# Patient Record
Sex: FEMALE | Race: WHITE | HISPANIC OR LATINO | URBAN - METROPOLITAN AREA
[De-identification: names, ages, dates, MRNs, and addresses within clinical notes are randomized per-mention and may not be internally consistent; named-entity substitution may affect disease eponyms.]

---

## 2017-07-07 ENCOUNTER — IMPORTED ENCOUNTER (OUTPATIENT)
Dept: URBAN - METROPOLITAN AREA CLINIC 50 | Facility: CLINIC | Age: 67
End: 2017-07-07

## 2017-07-14 ENCOUNTER — IMPORTED ENCOUNTER (OUTPATIENT)
Dept: URBAN - METROPOLITAN AREA CLINIC 50 | Facility: CLINIC | Age: 67
End: 2017-07-14

## 2017-08-16 ENCOUNTER — IMPORTED ENCOUNTER (OUTPATIENT)
Dept: URBAN - METROPOLITAN AREA CLINIC 50 | Facility: CLINIC | Age: 67
End: 2017-08-16

## 2017-09-22 ENCOUNTER — IMPORTED ENCOUNTER (OUTPATIENT)
Dept: URBAN - METROPOLITAN AREA CLINIC 50 | Facility: CLINIC | Age: 67
End: 2017-09-22

## 2017-09-27 ENCOUNTER — IMPORTED ENCOUNTER (OUTPATIENT)
Dept: URBAN - METROPOLITAN AREA CLINIC 50 | Facility: CLINIC | Age: 67
End: 2017-09-27

## 2018-06-01 ENCOUNTER — IMPORTED ENCOUNTER (OUTPATIENT)
Dept: URBAN - METROPOLITAN AREA CLINIC 50 | Facility: CLINIC | Age: 68
End: 2018-06-01

## 2018-08-24 ENCOUNTER — IMPORTED ENCOUNTER (OUTPATIENT)
Dept: URBAN - METROPOLITAN AREA CLINIC 50 | Facility: CLINIC | Age: 68
End: 2018-08-24

## 2018-08-31 ENCOUNTER — IMPORTED ENCOUNTER (OUTPATIENT)
Dept: URBAN - METROPOLITAN AREA CLINIC 50 | Facility: CLINIC | Age: 68
End: 2018-08-31

## 2018-10-26 ENCOUNTER — IMPORTED ENCOUNTER (OUTPATIENT)
Dept: URBAN - METROPOLITAN AREA CLINIC 50 | Facility: CLINIC | Age: 68
End: 2018-10-26

## 2018-12-07 ENCOUNTER — IMPORTED ENCOUNTER (OUTPATIENT)
Dept: URBAN - METROPOLITAN AREA CLINIC 50 | Facility: CLINIC | Age: 68
End: 2018-12-07

## 2019-01-04 ENCOUNTER — IMPORTED ENCOUNTER (OUTPATIENT)
Dept: URBAN - METROPOLITAN AREA CLINIC 50 | Facility: CLINIC | Age: 69
End: 2019-01-04

## 2019-01-09 ENCOUNTER — IMPORTED ENCOUNTER (OUTPATIENT)
Dept: URBAN - METROPOLITAN AREA CLINIC 50 | Facility: CLINIC | Age: 69
End: 2019-01-09

## 2019-01-14 ENCOUNTER — IMPORTED ENCOUNTER (OUTPATIENT)
Dept: URBAN - METROPOLITAN AREA CLINIC 50 | Facility: CLINIC | Age: 69
End: 2019-01-14

## 2019-06-07 ENCOUNTER — IMPORTED ENCOUNTER (OUTPATIENT)
Dept: URBAN - METROPOLITAN AREA CLINIC 50 | Facility: CLINIC | Age: 69
End: 2019-06-07

## 2019-07-05 ENCOUNTER — IMPORTED ENCOUNTER (OUTPATIENT)
Dept: URBAN - METROPOLITAN AREA CLINIC 50 | Facility: CLINIC | Age: 69
End: 2019-07-05

## 2019-07-05 NOTE — PATIENT DISCUSSION
"""Continue Vyzulta both eyes at bedtime . ( has 5 bottles of Xalatan that she can use up in place ""

## 2020-01-10 ENCOUNTER — IMPORTED ENCOUNTER (OUTPATIENT)
Dept: URBAN - METROPOLITAN AREA CLINIC 50 | Facility: CLINIC | Age: 70
End: 2020-01-10

## 2020-01-13 ENCOUNTER — IMPORTED ENCOUNTER (OUTPATIENT)
Dept: URBAN - METROPOLITAN AREA CLINIC 50 | Facility: CLINIC | Age: 70
End: 2020-01-13

## 2020-02-03 ENCOUNTER — IMPORTED ENCOUNTER (OUTPATIENT)
Dept: URBAN - METROPOLITAN AREA CLINIC 50 | Facility: CLINIC | Age: 70
End: 2020-02-03

## 2020-05-18 ENCOUNTER — IMPORTED ENCOUNTER (OUTPATIENT)
Dept: URBAN - METROPOLITAN AREA CLINIC 50 | Facility: CLINIC | Age: 70
End: 2020-05-18

## 2020-07-06 ENCOUNTER — IMPORTED ENCOUNTER (OUTPATIENT)
Dept: URBAN - METROPOLITAN AREA CLINIC 50 | Facility: CLINIC | Age: 70
End: 2020-07-06

## 2020-07-06 NOTE — PATIENT DISCUSSION
"""Elevated IOP OU today.  Long discussion with patient about the severity of her Glaucoma and the ""

## 2020-07-08 ENCOUNTER — IMPORTED ENCOUNTER (OUTPATIENT)
Dept: URBAN - METROPOLITAN AREA CLINIC 50 | Facility: CLINIC | Age: 70
End: 2020-07-08

## 2020-07-09 ENCOUNTER — IMPORTED ENCOUNTER (OUTPATIENT)
Dept: URBAN - METROPOLITAN AREA CLINIC 50 | Facility: CLINIC | Age: 70
End: 2020-07-09

## 2020-07-27 ENCOUNTER — IMPORTED ENCOUNTER (OUTPATIENT)
Dept: URBAN - METROPOLITAN AREA CLINIC 50 | Facility: CLINIC | Age: 70
End: 2020-07-27

## 2020-08-17 ENCOUNTER — IMPORTED ENCOUNTER (OUTPATIENT)
Dept: URBAN - METROPOLITAN AREA CLINIC 50 | Facility: CLINIC | Age: 70
End: 2020-08-17

## 2021-02-01 ENCOUNTER — IMPORTED ENCOUNTER (OUTPATIENT)
Dept: URBAN - METROPOLITAN AREA CLINIC 50 | Facility: CLINIC | Age: 71
End: 2021-02-01

## 2021-03-23 ENCOUNTER — IMPORTED ENCOUNTER (OUTPATIENT)
Dept: URBAN - METROPOLITAN AREA CLINIC 50 | Facility: CLINIC | Age: 71
End: 2021-03-23

## 2021-04-09 ENCOUNTER — IMPORTED ENCOUNTER (OUTPATIENT)
Dept: URBAN - METROPOLITAN AREA CLINIC 50 | Facility: CLINIC | Age: 71
End: 2021-04-09

## 2021-04-12 ENCOUNTER — IMPORTED ENCOUNTER (OUTPATIENT)
Dept: URBAN - METROPOLITAN AREA CLINIC 50 | Facility: CLINIC | Age: 71
End: 2021-04-12

## 2021-04-12 NOTE — PATIENT DISCUSSION
"""IOP stable today. Will continue current drop therapy. "" ""Continue Xalatan both eyes at bedtime . "" ""Continue Betimol both eyes in the morning ."""

## 2021-04-12 NOTE — PATIENT DISCUSSION
"""Sever dry eye. Will start Restasis OU BID.  Patient already scheduled for IOP check and cataract ""

## 2021-04-18 ASSESSMENT — PACHYMETRY
OD_CT_UM: 524
OS_CT_UM: 531
OS_CT_UM: 531
OD_CT_UM: 524
OS_CT_UM: 531
OD_CT_UM: 524
OS_CT_UM: 531
OD_CT_UM: 524
OS_CT_UM: 531
OD_CT_UM: 524
OS_CT_UM: 531
OD_CT_UM: 524
OS_CT_UM: 531
OS_CT_UM: 531
OD_CT_UM: 524
OS_CT_UM: 531
OD_CT_UM: 524
OS_CT_UM: 531
OS_CT_UM: 531
OD_CT_UM: 524
OS_CT_UM: 531
OS_CT_UM: 531
OD_CT_UM: 524
OD_CT_UM: 524
OS_CT_UM: 531
OS_CT_UM: 531
OD_CT_UM: 524
OS_CT_UM: 531
OD_CT_UM: 524
OS_CT_UM: 531
OD_CT_UM: 524
OD_CT_UM: 524
OS_CT_UM: 531
OS_CT_UM: 531
OD_CT_UM: 524
OD_CT_UM: 524

## 2021-04-18 ASSESSMENT — TONOMETRY
OD_IOP_MMHG: 12
OS_IOP_MMHG: 13
OD_IOP_MMHG: 13
OD_IOP_MMHG: 19
OD_IOP_MMHG: 16
OD_IOP_MMHG: 15
OS_IOP_MMHG: 21
OD_IOP_MMHG: 15
OD_IOP_MMHG: 11
OD_IOP_MMHG: 14
OD_IOP_MMHG: 15
OS_IOP_MMHG: 15
OD_IOP_MMHG: 14
OD_IOP_MMHG: 15
OD_IOP_MMHG: 18
OS_IOP_MMHG: 16
OD_IOP_MMHG: 14
OS_IOP_MMHG: 16
OS_IOP_MMHG: 24
OD_IOP_MMHG: 16
OS_IOP_MMHG: 14
OS_IOP_MMHG: 12
OD_IOP_MMHG: 16
OS_IOP_MMHG: 16
OS_IOP_MMHG: 25
OS_IOP_MMHG: 15
OS_IOP_MMHG: 14
OD_IOP_MMHG: 14
OD_IOP_MMHG: 13
OS_IOP_MMHG: 15
OS_IOP_MMHG: 16
OS_IOP_MMHG: 15
OS_IOP_MMHG: 16
OD_IOP_MMHG: 15
OD_IOP_MMHG: 20
OD_IOP_MMHG: 15
OD_IOP_MMHG: 22
OS_IOP_MMHG: 15
OS_IOP_MMHG: 17
OS_IOP_MMHG: 22
OD_IOP_MMHG: 14
OS_IOP_MMHG: 14
OS_IOP_MMHG: 15
OD_IOP_MMHG: 14
OD_IOP_MMHG: 13
OD_IOP_MMHG: 15
OS_IOP_MMHG: 20
OD_IOP_MMHG: 15
OD_IOP_MMHG: 14
OD_IOP_MMHG: 17
OS_IOP_MMHG: 19
OS_IOP_MMHG: 18
OS_IOP_MMHG: 15
OS_IOP_MMHG: 14
OS_IOP_MMHG: 11
OD_IOP_MMHG: 12
OS_IOP_MMHG: 15
OS_IOP_MMHG: 14
OS_IOP_MMHG: 21
OD_IOP_MMHG: 21

## 2021-04-18 ASSESSMENT — VISUAL ACUITY
OS_CC: J1
OS_CC: 20/30
OD_CC: 20/25
OS_CC: 20/25
OS_CC: J1@ 14 IN
OD_CC: 20/40
OS_CC: J2@ 16 IN
OS_OTHER: 20/30. 20/60.
OS_OTHER: 20/60. >20/400.
OD_CC: 20/20-2
OD_BAT: 20/50
OS_CC: 20/25-1
OS_CC: 20/30
OS_OTHER: 20/50-2. >20/400.
OD_CC: 20/30
OS_BAT: 20/40
OS_CC: 20/30
OS_CC: J1
OD_OTHER: 20/30. 20/60.
OD_BAT: 20/50
OD_SC: 20/25
OD_CC: 20/30
OD_CC: 20/40-1
OS_CC: 20/25-2
OS_BAT: 20/60
OS_BAT: 20/50-2
OD_CC: J1
OS_OTHER: 20/40. 20/50.
OD_CC: 20/25-
OS_SC: 20/20
OD_CC: J2@ 16 IN
OD_OTHER: 20/30. 20/40.
OS_BAT: 20/30
OS_CC: 20/25
OS_CC: 20/25-
OD_CC: 20/30
OS_CC: 20/30+2
OS_CC: 20/30-1
OD_CC: 20/40
OD_CC: 20/30-2
OD_CC: J1
OS_CC: J1+@ 18 IN
OS_BAT: 20/30
OS_OTHER: 20/30. 20/50.
OS_CC: 20/25-1
OD_OTHER: 20/40. >20/400.
OS_CC: 20/30
OD_CC: J1@ 14 IN
OD_OTHER: 20/50. <20/400.
OD_CC: 20/30-1
OD_CC: 20/30-1
OD_OTHER: 20/50. 20/60.
OD_BAT: 20/40
OS_CC: 20/40-2
OS_CC: 20/30
OD_CC: 20/30-2
OD_BAT: 20/30
OD_CC: J1+@ 18 IN
OD_CC: 20/25+2
OD_BAT: 20/30

## 2021-06-16 ENCOUNTER — DIAGNOSTICS ONLY (OUTPATIENT)
Dept: URBAN - METROPOLITAN AREA CLINIC 52 | Facility: CLINIC | Age: 71
End: 2021-06-16

## 2021-06-16 DIAGNOSIS — H47.233: ICD-10-CM

## 2021-06-16 PROCEDURE — 92273 FULL FIELD ERG W/I&R: CPT

## 2021-06-16 NOTE — PATIENT DISCUSSION
"""IOP stable today. Will continue current drop therapy. "" ""Continue Xalatan both eyes at bedtime . "" ""Continue Betimol both eyes in the morning ."". " 25-Mar-2017

## 2021-08-02 ENCOUNTER — DIAGNOSTICS - HVF/OCT (OUTPATIENT)
Dept: URBAN - METROPOLITAN AREA CLINIC 50 | Facility: CLINIC | Age: 71
End: 2021-08-02

## 2021-08-02 DIAGNOSIS — H40.1133: ICD-10-CM

## 2021-08-02 PROCEDURE — 92133 CPTRZD OPH DX IMG PST SGM ON: CPT

## 2021-08-02 PROCEDURE — 92083 EXTENDED VISUAL FIELD XM: CPT

## 2021-08-02 NOTE — PATIENT DISCUSSION
"""IOP stable today. Will continue current drop therapy. "" ""Continue Xalatan both eyes at bedtime . "" ""Continue Betimol both eyes in the morning ."". "

## 2021-08-16 ENCOUNTER — 6 MONTH COMPREHENSIVE EXAM (OUTPATIENT)
Dept: URBAN - METROPOLITAN AREA CLINIC 50 | Facility: CLINIC | Age: 71
End: 2021-08-16

## 2021-08-16 DIAGNOSIS — H25.13: ICD-10-CM

## 2021-08-16 DIAGNOSIS — H35.373: ICD-10-CM

## 2021-08-16 DIAGNOSIS — H16.223: ICD-10-CM

## 2021-08-16 DIAGNOSIS — H47.233: ICD-10-CM

## 2021-08-16 DIAGNOSIS — H40.1133: ICD-10-CM

## 2021-08-16 PROCEDURE — 92014 COMPRE OPH EXAM EST PT 1/>: CPT

## 2021-08-16 PROCEDURE — 92134 CPTRZ OPH DX IMG PST SGM RTA: CPT

## 2021-08-16 ASSESSMENT — VISUAL ACUITY
OD_GLARE: 20/70
OD_CC: 20/40 BLURRY
OU_CC: J2@14IN
OD_GLARE: 20/40
OS_GLARE: 20/50
OS_CC: 20/25-1 BLURRY
OS_GLARE: 20/80
OU_CC: 20/25-1

## 2021-08-16 ASSESSMENT — KERATOMETRY
OD_AXISANGLE_DEGREES: 083
OS_K1POWER_DIOPTERS: 43.50
OS_K2POWER_DIOPTERS: 43.00
OD_AXISANGLE2_DEGREES: 173
OS_AXISANGLE2_DEGREES: 159
OD_K2POWER_DIOPTERS: 42.50
OD_K1POWER_DIOPTERS: 43.00
OS_AXISANGLE_DEGREES: 069

## 2021-08-16 ASSESSMENT — TONOMETRY
OD_IOP_MMHG: 14
OS_IOP_MMHG: 16
OD_IOP_MMHG: 15
OS_IOP_MMHG: 16

## 2021-12-06 ENCOUNTER — FOLLOW UP (OUTPATIENT)
Dept: URBAN - METROPOLITAN AREA CLINIC 50 | Facility: CLINIC | Age: 71
End: 2021-12-06

## 2021-12-06 DIAGNOSIS — H16.223: ICD-10-CM

## 2021-12-06 DIAGNOSIS — H40.1133: ICD-10-CM

## 2021-12-06 PROCEDURE — 92012 INTRM OPH EXAM EST PATIENT: CPT

## 2021-12-06 ASSESSMENT — KERATOMETRY
OS_K2POWER_DIOPTERS: 43.00
OS_K1POWER_DIOPTERS: 43.50
OD_K2POWER_DIOPTERS: 42.50
OD_K1POWER_DIOPTERS: 43.00
OS_AXISANGLE2_DEGREES: 159
OD_AXISANGLE_DEGREES: 083
OS_AXISANGLE_DEGREES: 069
OD_AXISANGLE2_DEGREES: 173

## 2021-12-06 ASSESSMENT — TONOMETRY
OS_IOP_MMHG: 16
OD_IOP_MMHG: 14
OD_IOP_MMHG: 15
OS_IOP_MMHG: 17

## 2021-12-06 ASSESSMENT — VISUAL ACUITY
OS_CC: 20/25
OD_CC: 20/40

## 2022-03-07 ENCOUNTER — COMPREHENSIVE EXAM (OUTPATIENT)
Dept: URBAN - METROPOLITAN AREA CLINIC 50 | Facility: CLINIC | Age: 72
End: 2022-03-07

## 2022-03-07 DIAGNOSIS — H43.811: ICD-10-CM

## 2022-03-07 DIAGNOSIS — H16.223: ICD-10-CM

## 2022-03-07 DIAGNOSIS — H35.373: ICD-10-CM

## 2022-03-07 DIAGNOSIS — H25.13: ICD-10-CM

## 2022-03-07 DIAGNOSIS — H40.1133: ICD-10-CM

## 2022-03-07 PROCEDURE — 92014 COMPRE OPH EXAM EST PT 1/>: CPT

## 2022-03-07 ASSESSMENT — KERATOMETRY
OD_K2POWER_DIOPTERS: 42.50
OD_K1POWER_DIOPTERS: 43.00
OD_AXISANGLE2_DEGREES: 173
OS_AXISANGLE_DEGREES: 069
OS_K2POWER_DIOPTERS: 43.00
OS_AXISANGLE2_DEGREES: 159
OD_AXISANGLE_DEGREES: 083
OS_K1POWER_DIOPTERS: 43.50

## 2022-03-07 ASSESSMENT — VISUAL ACUITY
OD_GLARE: 20/100
OD_CC: 20/60
OS_GLARE: 20/200
OS_SC: 20/50
OU_CC: 20/50
OD_PH: 20/50
OS_CC: 20/50
OD_SC: 20/60
OU_CC: J3@ 14 IN
OS_GLARE: 20/400
OD_GLARE: 20/200

## 2022-03-07 ASSESSMENT — TONOMETRY
OS_IOP_MMHG: 15
OD_IOP_MMHG: 15
OS_IOP_MMHG: 16
OD_IOP_MMHG: 14

## 2022-03-07 NOTE — PATIENT DISCUSSION
No MF due to Severe Glaucoma as well as significant dryness. Recommend patient to continue wearing glasses post surgery to also help protect the cornea due to significant dryness.

## 2022-04-07 ENCOUNTER — DIAGNOSTICS ONLY (OUTPATIENT)
Dept: URBAN - METROPOLITAN AREA CLINIC 50 | Facility: CLINIC | Age: 72
End: 2022-04-07

## 2022-04-07 DIAGNOSIS — H25.13: ICD-10-CM

## 2022-04-07 DIAGNOSIS — H35.373: ICD-10-CM

## 2022-04-07 PROCEDURE — 92025IOL CORNEAL TOPOGRAPHY PREMIUM IOL

## 2022-04-07 PROCEDURE — 92136 OPHTHALMIC BIOMETRY: CPT

## 2022-04-07 PROCEDURE — 92134 CPTRZ OPH DX IMG PST SGM RTA: CPT

## 2022-04-07 ASSESSMENT — KERATOMETRY
OD_AXISANGLE_DEGREES: 178
OD_K1POWER_DIOPTERS: 42.50
OS_K1POWER_DIOPTERS: 42.87
OD_AXISANGLE2_DEGREES: 88
OD_K2POWER_DIOPTERS: 42.12
OS_K2POWER_DIOPTERS: 42.37
OS_AXISANGLE2_DEGREES: 096
OS_AXISANGLE_DEGREES: 6

## 2022-06-06 ENCOUNTER — PRE-OP/H&P (OUTPATIENT)
Dept: URBAN - METROPOLITAN AREA CLINIC 50 | Facility: CLINIC | Age: 72
End: 2022-06-06

## 2022-06-06 DIAGNOSIS — H25.11: ICD-10-CM

## 2022-06-06 DIAGNOSIS — H35.373: ICD-10-CM

## 2022-06-06 DIAGNOSIS — H40.1113: ICD-10-CM

## 2022-06-06 PROCEDURE — 92134 CPTRZ OPH DX IMG PST SGM RTA: CPT

## 2022-06-06 PROCEDURE — PREOP PRE OP VISIT

## 2022-06-06 ASSESSMENT — KERATOMETRY
OD_K1POWER_DIOPTERS: 42.50
OD_AXISANGLE_DEGREES: 178
OD_K2POWER_DIOPTERS: 42.12
OS_AXISANGLE_DEGREES: 6
OD_AXISANGLE2_DEGREES: 88
OS_K1POWER_DIOPTERS: 42.87
OS_K2POWER_DIOPTERS: 42.37
OS_AXISANGLE2_DEGREES: 096

## 2022-06-06 ASSESSMENT — VISUAL ACUITY
OS_CC: 20/50
OD_PH: 20/50
OD_CC: 20/60

## 2022-06-06 ASSESSMENT — TONOMETRY
OD_IOP_MMHG: 15
OD_IOP_MMHG: 14
OS_IOP_MMHG: 14
OS_IOP_MMHG: 15

## 2022-06-06 NOTE — PATIENT DISCUSSION
CATARACT SURGERY PLANNER - STANDARD IOL/+FEMTO/+CANALOPLASTY (OMNI): Phacoemulsification with IOL: Eye: OD|DOS: 6/16/2022|Model: DIBOO|Power: 24. 0|Target: PLANO|Femto: YES|Arcs: 42° @ 85° ; 42° @ 265°|CANALOPLASTY: YES|Visc: DUET|Omidria: YES|10% Phenylephrine: YES|Epi-shugarcaine: YES|Phaco Setting: STD|Notes: Plan: DIBOO w/Femto Target PLANO OU. Hx: Severe POAG OU; ERM OU; KCS OU.

## 2022-06-15 ASSESSMENT — KERATOMETRY
OD_K2POWER_DIOPTERS: 42.12
OS_AXISANGLE2_DEGREES: 096
OS_K2POWER_DIOPTERS: 42.37
OD_AXISANGLE2_DEGREES: 88
OS_AXISANGLE_DEGREES: 6
OS_K1POWER_DIOPTERS: 42.87
OD_K1POWER_DIOPTERS: 42.50
OD_AXISANGLE_DEGREES: 178

## 2022-06-16 ENCOUNTER — POST-OP (OUTPATIENT)
Dept: URBAN - METROPOLITAN AREA CLINIC 50 | Facility: CLINIC | Age: 72
End: 2022-06-16

## 2022-06-16 ENCOUNTER — SURGERY/PROCEDURE (OUTPATIENT)
Dept: URBAN - METROPOLITAN AREA SURGERY 16 | Facility: SURGERY | Age: 72
End: 2022-06-16

## 2022-06-16 DIAGNOSIS — Z96.1: ICD-10-CM

## 2022-06-16 DIAGNOSIS — H40.1113: ICD-10-CM

## 2022-06-16 DIAGNOSIS — H25.11: ICD-10-CM

## 2022-06-16 DIAGNOSIS — Z98.41: ICD-10-CM

## 2022-06-16 PROCEDURE — 99199PCLA CLASSIC VISION PACKAGE

## 2022-06-16 PROCEDURE — 66984 XCAPSL CTRC RMVL W/O ECP: CPT

## 2022-06-16 PROCEDURE — 66174 TRLUML DIL AQ O/F CAN W/O ST: CPT

## 2022-06-16 ASSESSMENT — KERATOMETRY
OD_AXISANGLE2_DEGREES: 88
OS_K1POWER_DIOPTERS: 42.87
OD_K1POWER_DIOPTERS: 42.50
OD_K2POWER_DIOPTERS: 42.12
OS_K2POWER_DIOPTERS: 42.37
OS_AXISANGLE_DEGREES: 6
OD_AXISANGLE_DEGREES: 178
OS_AXISANGLE2_DEGREES: 096

## 2022-06-16 ASSESSMENT — VISUAL ACUITY: OD_SC: CF 2FT

## 2022-06-16 ASSESSMENT — TONOMETRY: OD_IOP_MMHG: 21

## 2022-06-16 NOTE — PATIENT DISCUSSION
Continue current management. Double O-Z Plasty Text: The defect edges were debeveled with a #15 scalpel blade.  Given the location of the defect, shape of the defect and the proximity to free margins a Double O-Z plasty (double transposition flap) was deemed most appropriate.  Using a sterile surgical marker, the appropriate transposition flaps were drawn incorporating the defect and placing the expected incisions within the relaxed skin tension lines where possible. The area thus outlined was incised deep to adipose tissue with a #15 scalpel blade.  The skin margins were undermined to an appropriate distance in all directions utilizing iris scissors.  Hemostasis was achieved with electrocautery.  The flaps were then transposed into place, one clockwise and the other counterclockwise, and anchored with interrupted buried subcutaneous sutures.

## 2022-06-20 ENCOUNTER — PRE-OP/H&P (OUTPATIENT)
Dept: URBAN - METROPOLITAN AREA CLINIC 50 | Facility: CLINIC | Age: 72
End: 2022-06-20

## 2022-06-20 DIAGNOSIS — Z98.41: ICD-10-CM

## 2022-06-20 DIAGNOSIS — H25.12: ICD-10-CM

## 2022-06-20 DIAGNOSIS — H25.11: ICD-10-CM

## 2022-06-20 PROCEDURE — PREOP PRE OP VISIT

## 2022-06-20 ASSESSMENT — TONOMETRY
OS_IOP_MMHG: 15
OD_IOP_MMHG: 14
OS_IOP_MMHG: 14
OD_IOP_MMHG: 13

## 2022-06-20 ASSESSMENT — VISUAL ACUITY
OS_SC: 20/40-1
OD_SC: J3@16"
OU_SC: 20/25
OD_SC: 20/50@22"
OD_SC: 20/25+1

## 2022-06-20 NOTE — PATIENT DISCUSSION
CATARACT SURGERY PLANNER - STANDARD IOL/+FEMTO/CANALOPLASTY: Phacoemulsification with IOL: Eye: OS|DOS: 6/30/2022|Model: DIBOO|Power: 24. 5|Target: PLANO|Femto: YES|Arcs: 30° @ 95° ; 30° @ 275°|CANALOPLASTY: YES |Visc: DUET|Omidria: YES|10% Phenylephrine: YES|Epi-shugarcaine: YES|Phaco Setting: STD|Notes: Plan: DIBOO w/Femto Target PLANO OU; Canaloplasty OU. Hx: Severe POAG OU; ERM OU; KCS OU.

## 2022-06-27 ENCOUNTER — EMERGENCY VISIT (OUTPATIENT)
Dept: URBAN - METROPOLITAN AREA CLINIC 50 | Facility: CLINIC | Age: 72
End: 2022-06-27

## 2022-06-27 DIAGNOSIS — H35.373: ICD-10-CM

## 2022-06-27 DIAGNOSIS — Z98.41: ICD-10-CM

## 2022-06-27 PROCEDURE — 92134 CPTRZ OPH DX IMG PST SGM RTA: CPT

## 2022-06-27 PROCEDURE — 99024 POSTOP FOLLOW-UP VISIT: CPT

## 2022-06-27 ASSESSMENT — TONOMETRY
OD_IOP_MMHG: 16
OD_IOP_MMHG: 17
OS_IOP_MMHG: 17
OS_IOP_MMHG: 16

## 2022-06-27 ASSESSMENT — VISUAL ACUITY
OD_SC: 20/30-1
OS_SC: 20/60

## 2022-06-30 ENCOUNTER — POST-OP (OUTPATIENT)
Dept: URBAN - METROPOLITAN AREA CLINIC 49 | Facility: CLINIC | Age: 72
End: 2022-06-30

## 2022-06-30 ENCOUNTER — SURGERY/PROCEDURE (OUTPATIENT)
Dept: URBAN - METROPOLITAN AREA SURGERY 16 | Facility: SURGERY | Age: 72
End: 2022-06-30

## 2022-06-30 DIAGNOSIS — Z98.42: ICD-10-CM

## 2022-06-30 DIAGNOSIS — H25.12: ICD-10-CM

## 2022-06-30 DIAGNOSIS — Z96.1: ICD-10-CM

## 2022-06-30 DIAGNOSIS — H40.1123: ICD-10-CM

## 2022-06-30 PROCEDURE — 99199PCLA CLASSIC VISION PACKAGE

## 2022-06-30 PROCEDURE — 66984 XCAPSL CTRC RMVL W/O ECP: CPT

## 2022-06-30 PROCEDURE — 66174 TRLUML DIL AQ O/F CAN W/O ST: CPT

## 2022-06-30 ASSESSMENT — VISUAL ACUITY
OS_PH: 20/80
OS_SC: 20/100

## 2022-06-30 ASSESSMENT — TONOMETRY
OS_IOP_MMHG: 18
OS_IOP_MMHG: 17

## 2022-07-05 ENCOUNTER — POST-OP (OUTPATIENT)
Dept: URBAN - METROPOLITAN AREA CLINIC 49 | Facility: CLINIC | Age: 72
End: 2022-07-05

## 2022-07-05 DIAGNOSIS — Z98.42: ICD-10-CM

## 2022-07-05 DIAGNOSIS — H35.373: ICD-10-CM

## 2022-07-05 PROCEDURE — 92134 CPTRZ OPH DX IMG PST SGM RTA: CPT

## 2022-07-05 PROCEDURE — 99024 POSTOP FOLLOW-UP VISIT: CPT

## 2022-07-05 ASSESSMENT — VISUAL ACUITY
OS_SC: 20/60-1
OS_PH: 20/50
OD_SC: 20/40-1

## 2022-07-05 ASSESSMENT — TONOMETRY
OS_IOP_MMHG: 12
OD_IOP_MMHG: 13
OS_IOP_MMHG: 13
OD_IOP_MMHG: 14

## 2022-07-25 ENCOUNTER — POST-OP (OUTPATIENT)
Dept: URBAN - METROPOLITAN AREA CLINIC 50 | Facility: CLINIC | Age: 72
End: 2022-07-25

## 2022-07-25 DIAGNOSIS — H40.1133: ICD-10-CM

## 2022-07-25 DIAGNOSIS — H20.023: ICD-10-CM

## 2022-07-25 DIAGNOSIS — Z98.42: ICD-10-CM

## 2022-07-25 DIAGNOSIS — Z98.41: ICD-10-CM

## 2022-07-25 PROCEDURE — 99024 POSTOP FOLLOW-UP VISIT: CPT

## 2022-07-25 PROCEDURE — 92015GRNC REFRACTION GLASSES RECHECK NO CHARGE

## 2022-07-25 RX ORDER — PREDNISOLONE ACETATE 10 MG/ML
1 SUSPENSION/ DROPS OPHTHALMIC
Start: 2022-07-25

## 2022-07-25 RX ORDER — CYCLOSPORINE 0.5 MG/ML: 1 EMULSION OPHTHALMIC TWICE A DAY

## 2022-07-25 ASSESSMENT — TONOMETRY
OD_IOP_MMHG: 13
OS_IOP_MMHG: 12
OS_IOP_MMHG: 13
OD_IOP_MMHG: 12

## 2022-07-25 ASSESSMENT — VISUAL ACUITY
OU_SC: J2
OU_SC: 20/20
OU_SC: 20/25
OD_SC: 20/20
OS_SC: 20/20

## 2022-08-01 ENCOUNTER — EMERGENCY VISIT (OUTPATIENT)
Dept: URBAN - METROPOLITAN AREA CLINIC 50 | Facility: CLINIC | Age: 72
End: 2022-08-01

## 2022-08-01 DIAGNOSIS — H16.223: ICD-10-CM

## 2022-08-01 DIAGNOSIS — H20.023: ICD-10-CM

## 2022-08-01 PROCEDURE — 99024 POSTOP FOLLOW-UP VISIT: CPT

## 2022-08-01 ASSESSMENT — VISUAL ACUITY
OD_SC: 20/25
OS_SC: 20/25

## 2022-08-01 ASSESSMENT — TONOMETRY
OD_IOP_MMHG: 13
OS_IOP_MMHG: 12
OD_IOP_MMHG: 12
OS_IOP_MMHG: 13

## 2022-08-16 ENCOUNTER — EMERGENCY VISIT (OUTPATIENT)
Dept: URBAN - METROPOLITAN AREA CLINIC 49 | Facility: CLINIC | Age: 72
End: 2022-08-16

## 2022-08-16 DIAGNOSIS — H20.023: ICD-10-CM

## 2022-08-16 DIAGNOSIS — Z96.1: ICD-10-CM

## 2022-08-16 PROCEDURE — 99024 POSTOP FOLLOW-UP VISIT: CPT

## 2022-08-16 RX ORDER — KETOROLAC TROMETHAMINE 4 MG/ML: 1 SOLUTION/ DROPS OPHTHALMIC TWICE A DAY

## 2022-08-16 RX ORDER — LOTEPREDNOL ETABONATE 5 MG/ML: 1 SUSPENSION/ DROPS OPHTHALMIC TWICE A DAY

## 2022-08-16 ASSESSMENT — VISUAL ACUITY
OS_PH: 20/25
OS_SC: 20/30+2
OD_SC: 20/25

## 2022-08-16 ASSESSMENT — TONOMETRY
OS_IOP_MMHG: 15
OD_IOP_MMHG: 16
OD_IOP_MMHG: 15
OS_IOP_MMHG: 14

## 2022-08-16 NOTE — PATIENT DISCUSSION
Start Lotemax (or Loteprednol)BID x 8 weeks. Start Ketorolac BID x 4 weeks, then decrease to QD x 4 weeks. Will re-evaluate in 8 weeks.

## 2022-08-16 NOTE — PATIENT DISCUSSION
Patient has a history of autoimmune disorder, biliary cirrhosis, diagnosed 10 years ago and is treated/followed by Dr. Claire Issa. Chris at [facility] in [facility]. 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

## 2022-10-11 ENCOUNTER — POST-OP (OUTPATIENT)
Dept: URBAN - METROPOLITAN AREA CLINIC 49 | Facility: CLINIC | Age: 72
End: 2022-10-11

## 2022-10-11 DIAGNOSIS — H16.223: ICD-10-CM

## 2022-10-11 DIAGNOSIS — H20.023: ICD-10-CM

## 2022-10-11 PROCEDURE — 92012 INTRM OPH EXAM EST PATIENT: CPT

## 2022-10-11 ASSESSMENT — VISUAL ACUITY
OS_PH: 20/25
OS_SC: 20/40+2
OD_SC: 20/30-2
OD_PH: 20/25

## 2022-10-11 ASSESSMENT — TONOMETRY
OS_IOP_MMHG: 12
OD_IOP_MMHG: 13
OS_IOP_MMHG: 11
OD_IOP_MMHG: 12

## 2022-10-11 NOTE — PATIENT DISCUSSION
Patient has a history of autoimmune disorder, biliary cirrhosis, diagnosed 10 years ago and is treated/followed by Dr. Estelle Torres Truxton at [facility] in [facility]. 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

## 2022-10-11 NOTE — PATIENT DISCUSSION
Inflammation in the eye has reduced significantly. Patient is still to continue Xalatan qhs, Betimol qam, and Restasis bid.

## 2022-12-01 ENCOUNTER — COMPREHENSIVE EXAM (OUTPATIENT)
Dept: URBAN - METROPOLITAN AREA CLINIC 50 | Facility: CLINIC | Age: 72
End: 2022-12-01

## 2022-12-01 PROCEDURE — 92014 COMPRE OPH EXAM EST PT 1/>: CPT

## 2022-12-01 RX ORDER — PREDNISOLONE ACETATE 10 MG/ML: 1 SUSPENSION/ DROPS OPHTHALMIC

## 2022-12-01 ASSESSMENT — VISUAL ACUITY
OS_SC: 20/30
OD_GLARE: 20/50
OS_GLARE: 20/50
OU_SC: 20/25
OD_SC: 20/25
OS_GLARE: 20/30
OD_GLARE: 20/30

## 2022-12-01 ASSESSMENT — TONOMETRY
OS_IOP_MMHG: 13
OD_IOP_MMHG: 13
OS_IOP_MMHG: 12
OD_IOP_MMHG: 12

## 2022-12-01 ASSESSMENT — KERATOMETRY
OS_K2POWER_DIOPTERS: 44.00
OD_AXISANGLE_DEGREES: 009
OS_AXISANGLE_DEGREES: 166
OD_K1POWER_DIOPTERS: 42.50
OD_K2POWER_DIOPTERS: 43.00
OD_AXISANGLE2_DEGREES: 99
OS_AXISANGLE2_DEGREES: 76
OS_K1POWER_DIOPTERS: 43.25

## 2022-12-01 NOTE — PATIENT DISCUSSION
From last exam: Patient has a history of autoimmune disorder, biliary cirrhosis, diagnosed 10 years ago and is treated/followed by Dr. Pérez Perez at [facility] in [facility]. 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

## 2022-12-01 NOTE — PATIENT DISCUSSION
Rare Cell/Flare noted on today's exam. Patient is to begin Pred Acetate TID x7 days, BIS x7 days, QDAY x7 days, then dis continue. Will follow up with patient in 4-6 weeks to reassess the inflammation. Pred Acetate sent to pharmacy on file.

## 2022-12-30 ENCOUNTER — FOLLOW UP (OUTPATIENT)
Dept: URBAN - METROPOLITAN AREA CLINIC 50 | Facility: CLINIC | Age: 72
End: 2022-12-30

## 2022-12-30 PROCEDURE — 92012 INTRM OPH EXAM EST PATIENT: CPT

## 2022-12-30 ASSESSMENT — TONOMETRY
OD_IOP_MMHG: 15
OD_IOP_MMHG: 14
OS_IOP_MMHG: 13
OS_IOP_MMHG: 14

## 2022-12-30 ASSESSMENT — VISUAL ACUITY
OS_PH: 20/40+2
OD_SC: 20/40
OS_SC: 20/30
OD_SC: 20/30
OU_SC: 20/30

## 2022-12-30 NOTE — PATIENT DISCUSSION
Not assessed undilated. Instructed to call immediately if any new distortion, blurring, or decreased vision.

## 2022-12-30 NOTE — PATIENT DISCUSSION
From last exam: Patient has a history of autoimmune disorder, biliary cirrhosis, diagnosed 10 years ago and is treated/followed by Dr. Jimmy Perez at [facility] in [facility]. 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

## 2022-12-30 NOTE — PATIENT DISCUSSION
Patient has fully resolved at this time. She discontinued the Prednisolone.  notates that this is the best her eyes have ever looked. Discussed that some people are on an every other day Prednisolone Drop if she chooses too. If she chooses to do every other day then she will need to schedule in 6 weeks for an IOP check. She is already scheduled with Dr. Marlo Rodriguez and will have him consider long term every other day dosing. ambulatory

## 2023-04-17 ENCOUNTER — DIAGNOSTICS ONLY (OUTPATIENT)
Dept: URBAN - METROPOLITAN AREA CLINIC 50 | Facility: CLINIC | Age: 73
End: 2023-04-17

## 2023-04-17 DIAGNOSIS — H40.1133: ICD-10-CM

## 2023-04-17 PROCEDURE — 92133 CPTRZD OPH DX IMG PST SGM ON: CPT

## 2023-04-17 PROCEDURE — 92083 EXTENDED VISUAL FIELD XM: CPT

## 2023-07-17 ENCOUNTER — ESTABLISHED PATIENT (OUTPATIENT)
Dept: URBAN - METROPOLITAN AREA CLINIC 50 | Facility: CLINIC | Age: 73
End: 2023-07-17

## 2023-07-17 DIAGNOSIS — H35.373: ICD-10-CM

## 2023-07-17 DIAGNOSIS — H40.1133: ICD-10-CM

## 2023-07-17 DIAGNOSIS — H43.811: ICD-10-CM

## 2023-07-17 DIAGNOSIS — H16.223: ICD-10-CM

## 2023-07-17 PROCEDURE — 92014 COMPRE OPH EXAM EST PT 1/>: CPT

## 2023-07-17 ASSESSMENT — TONOMETRY
OD_IOP_MMHG: 15
OS_IOP_MMHG: 15
OD_IOP_MMHG: 14
OS_IOP_MMHG: 14

## 2023-07-17 ASSESSMENT — KERATOMETRY
OD_K2POWER_DIOPTERS: 43.25
OS_AXISANGLE_DEGREES: 5
OS_K1POWER_DIOPTERS: 43.00
OS_K2POWER_DIOPTERS: 43.50
OS_AXISANGLE2_DEGREES: 95
OD_AXISANGLE2_DEGREES: 87
OD_K1POWER_DIOPTERS: 42.75
OD_AXISANGLE_DEGREES: 177

## 2023-07-17 ASSESSMENT — VISUAL ACUITY
OD_SC: 20/20-1
OU_SC: 20/20-2
OS_SC: 20/25-2

## 2023-08-21 ENCOUNTER — FOLLOW UP (OUTPATIENT)
Dept: URBAN - METROPOLITAN AREA CLINIC 50 | Facility: LOCATION | Age: 73
End: 2023-08-21

## 2023-08-21 DIAGNOSIS — H16.223: ICD-10-CM

## 2023-08-21 DIAGNOSIS — H40.1133: ICD-10-CM

## 2023-08-21 PROCEDURE — 92012 INTRM OPH EXAM EST PATIENT: CPT

## 2023-08-21 ASSESSMENT — KERATOMETRY
OD_AXISANGLE2_DEGREES: 87
OD_AXISANGLE_DEGREES: 177
OD_K2POWER_DIOPTERS: 43.25
OS_AXISANGLE2_DEGREES: 95
OD_K1POWER_DIOPTERS: 42.75
OS_K1POWER_DIOPTERS: 43.00
OS_AXISANGLE_DEGREES: 5
OS_K2POWER_DIOPTERS: 43.50

## 2023-08-21 ASSESSMENT — TONOMETRY
OS_IOP_MMHG: 14
OD_IOP_MMHG: 14
OS_IOP_MMHG: 13
OD_IOP_MMHG: 13

## 2023-08-21 ASSESSMENT — VISUAL ACUITY
OD_CC: 20/25-1
OU_CC: 20/25
OS_CC: 20/25-2

## 2023-11-20 ENCOUNTER — COMPREHENSIVE EXAM (OUTPATIENT)
Dept: URBAN - METROPOLITAN AREA CLINIC 50 | Facility: LOCATION | Age: 73
End: 2023-11-20

## 2023-11-20 DIAGNOSIS — H02.831: ICD-10-CM

## 2023-11-20 DIAGNOSIS — H43.811: ICD-10-CM

## 2023-11-20 DIAGNOSIS — H16.223: ICD-10-CM

## 2023-11-20 DIAGNOSIS — H40.1133: ICD-10-CM

## 2023-11-20 DIAGNOSIS — H26.493: ICD-10-CM

## 2023-11-20 DIAGNOSIS — H02.834: ICD-10-CM

## 2023-11-20 DIAGNOSIS — H35.373: ICD-10-CM

## 2023-11-20 PROCEDURE — 92083 EXTENDED VISUAL FIELD XM: CPT

## 2023-11-20 PROCEDURE — 92133 CPTRZD OPH DX IMG PST SGM ON: CPT

## 2023-11-20 PROCEDURE — 92014 COMPRE OPH EXAM EST PT 1/>: CPT

## 2023-11-20 ASSESSMENT — KERATOMETRY
OS_AXISANGLE_DEGREES: 173
OD_AXISANGLE_DEGREES: 177
OS_AXISANGLE2_DEGREES: 83
OD_AXISANGLE_DEGREES: 171
OD_AXISANGLE2_DEGREES: 81
OD_K1POWER_DIOPTERS: 42.50
OD_K2POWER_DIOPTERS: 43.25
OS_K2POWER_DIOPTERS: 43.50
OD_K2POWER_DIOPTERS: 43.00
OS_AXISANGLE2_DEGREES: 95
OD_AXISANGLE2_DEGREES: 87
OD_K1POWER_DIOPTERS: 42.75
OS_K2POWER_DIOPTERS: 44.00
OS_K1POWER_DIOPTERS: 43.00
OS_AXISANGLE_DEGREES: 5

## 2023-11-20 ASSESSMENT — VISUAL ACUITY
OS_CC: 20/30-1
OS_GLARE: 20/40
OD_GLARE: 20/80
OS_GLARE: 20/50
OS_PH: 20/25
OU_CC: 20/25
OD_GLARE: 20/40
OD_CC: 20/25
OU_CC: 20/25

## 2023-11-20 ASSESSMENT — TONOMETRY
OD_IOP_MMHG: 11
OD_IOP_MMHG: 12
OS_IOP_MMHG: 12
OS_IOP_MMHG: 13

## 2024-08-26 ENCOUNTER — FOLLOW UP (OUTPATIENT)
Dept: URBAN - METROPOLITAN AREA CLINIC 50 | Facility: LOCATION | Age: 74
End: 2024-08-26

## 2024-08-26 DIAGNOSIS — H40.1133: ICD-10-CM

## 2024-08-26 PROCEDURE — 99213 OFFICE O/P EST LOW 20 MIN: CPT

## 2024-08-26 RX ORDER — DORZOLAMIDE HYDROCHLORIDE TIMOLOL MALEATE 20; 5 MG/ML; MG/ML: 1 SOLUTION/ DROPS OPHTHALMIC TWICE A DAY

## 2024-08-26 ASSESSMENT — KERATOMETRY
OD_K2POWER_DIOPTERS: 43.25
OD_AXISANGLE_DEGREES: 171
OD_AXISANGLE2_DEGREES: 87
OD_K1POWER_DIOPTERS: 42.50
OD_AXISANGLE2_DEGREES: 81
OS_AXISANGLE_DEGREES: 173
OD_K2POWER_DIOPTERS: 43.00
OD_AXISANGLE_DEGREES: 177
OS_K2POWER_DIOPTERS: 43.50
OS_K2POWER_DIOPTERS: 44.00
OS_K1POWER_DIOPTERS: 43.00
OD_K1POWER_DIOPTERS: 42.75
OS_AXISANGLE2_DEGREES: 95
OS_AXISANGLE_DEGREES: 5
OS_AXISANGLE2_DEGREES: 83

## 2024-08-26 ASSESSMENT — VISUAL ACUITY
OS_CC: 20/25
OD_CC: 20/20-1

## 2024-08-26 ASSESSMENT — TONOMETRY
OS_IOP_MMHG: 14
OS_IOP_MMHG: 15
OD_IOP_MMHG: 13
OD_IOP_MMHG: 12

## 2024-10-16 ENCOUNTER — EMERGENCY VISIT (OUTPATIENT)
Dept: URBAN - METROPOLITAN AREA CLINIC 53 | Facility: CLINIC | Age: 74
End: 2024-10-16

## 2024-10-16 DIAGNOSIS — H52.4: ICD-10-CM

## 2024-10-16 DIAGNOSIS — H52.13: ICD-10-CM

## 2024-10-16 DIAGNOSIS — H16.223: ICD-10-CM

## 2024-10-16 DIAGNOSIS — H52.203: ICD-10-CM

## 2024-10-16 PROCEDURE — 92015 DETERMINE REFRACTIVE STATE: CPT

## 2024-10-16 PROCEDURE — 99212 OFFICE O/P EST SF 10 MIN: CPT

## 2024-11-13 ENCOUNTER — CONTACT LENSES/GLASSES VISIT (OUTPATIENT)
Dept: URBAN - METROPOLITAN AREA CLINIC 53 | Facility: CLINIC | Age: 74
End: 2024-11-13

## 2024-11-13 DIAGNOSIS — H52.4: ICD-10-CM

## 2024-11-13 PROCEDURE — 92015GRNC REFRACTION GLASSES RECHECK NO CHARGE

## 2024-12-23 ENCOUNTER — COMPREHENSIVE EXAM (OUTPATIENT)
Age: 74
End: 2024-12-23

## 2024-12-23 DIAGNOSIS — H43.811: ICD-10-CM

## 2024-12-23 DIAGNOSIS — H02.831: ICD-10-CM

## 2024-12-23 DIAGNOSIS — H16.223: ICD-10-CM

## 2024-12-23 DIAGNOSIS — H40.1133: ICD-10-CM

## 2024-12-23 DIAGNOSIS — H02.834: ICD-10-CM

## 2024-12-23 DIAGNOSIS — H35.373: ICD-10-CM

## 2024-12-23 DIAGNOSIS — H26.493: ICD-10-CM

## 2024-12-23 PROCEDURE — 99214 OFFICE O/P EST MOD 30 MIN: CPT

## 2024-12-23 PROCEDURE — 92134 CPTRZ OPH DX IMG PST SGM RTA: CPT

## 2025-04-04 ENCOUNTER — DIAGNOSTICS ONLY (OUTPATIENT)
Age: 75
End: 2025-04-04

## 2025-04-04 DIAGNOSIS — H40.1133: ICD-10-CM

## 2025-04-04 PROCEDURE — 92083 EXTENDED VISUAL FIELD XM: CPT

## 2025-04-04 PROCEDURE — 92133 CPTRZD OPH DX IMG PST SGM ON: CPT

## 2025-04-07 ENCOUNTER — FOLLOW UP (OUTPATIENT)
Age: 75
End: 2025-04-07

## 2025-04-07 DIAGNOSIS — H40.1133: ICD-10-CM

## 2025-04-07 PROCEDURE — 99213 OFFICE O/P EST LOW 20 MIN: CPT

## 2025-04-16 ENCOUNTER — DIAGNOSTICS ONLY (OUTPATIENT)
Age: 75
End: 2025-04-16

## 2025-04-16 DIAGNOSIS — H40.1133: ICD-10-CM

## 2025-04-16 PROCEDURE — 92133 CPTRZD OPH DX IMG PST SGM ON: CPT | Mod: NC

## 2025-04-16 PROCEDURE — 92082 INTERMEDIATE VISUAL FIELD XM: CPT | Mod: NC

## 2025-08-01 ENCOUNTER — COMPREHENSIVE EXAM (OUTPATIENT)
Age: 75
End: 2025-08-01

## 2025-08-01 DIAGNOSIS — H35.373: ICD-10-CM

## 2025-08-01 DIAGNOSIS — H16.223: ICD-10-CM

## 2025-08-01 DIAGNOSIS — H40.1133: ICD-10-CM

## 2025-08-01 DIAGNOSIS — H26.493: ICD-10-CM

## 2025-08-01 DIAGNOSIS — H43.811: ICD-10-CM

## 2025-08-01 PROCEDURE — 92134 CPTRZ OPH DX IMG PST SGM RTA: CPT

## 2025-08-01 PROCEDURE — 99214 OFFICE O/P EST MOD 30 MIN: CPT
